# Patient Record
Sex: FEMALE | Race: WHITE | Employment: FULL TIME | URBAN - METROPOLITAN AREA
[De-identification: names, ages, dates, MRNs, and addresses within clinical notes are randomized per-mention and may not be internally consistent; named-entity substitution may affect disease eponyms.]

---

## 2023-04-05 ENCOUNTER — HOSPITAL ENCOUNTER (OUTPATIENT)
Age: 33
Discharge: HOME | End: 2023-04-05
Payer: COMMERCIAL

## 2023-04-05 LAB
ABSOLUTE EOS #: 0.07 K/UL (ref 0–0.44)
ABSOLUTE IMMATURE GRANULOCYTE: <0.03 K/UL (ref 0–0.3)
ABSOLUTE LYMPH #: 3.94 K/UL (ref 1.1–3.7)
ABSOLUTE MONO #: 0.49 K/UL (ref 0.1–1.2)
ALBUMIN SERPL-MCNC: 4.7 G/DL (ref 3.5–5.2)
ALBUMIN/GLOBULIN RATIO: 1.7 (ref 1–2.5)
ALP SERPL-CCNC: 63 U/L (ref 35–104)
ALT SERPL-CCNC: 14 U/L (ref 5–33)
ANION GAP SERPL CALCULATED.3IONS-SCNC: 15 MMOL/L (ref 9–17)
AST SERPL-CCNC: 16 U/L
BASOPHILS # BLD: 1 % (ref 0–2)
BASOPHILS ABSOLUTE: 0.06 K/UL (ref 0–0.2)
BILIRUB SERPL-MCNC: 0.7 MG/DL (ref 0.3–1.2)
BUN SERPL-MCNC: 15 MG/DL (ref 6–20)
CALCIUM SERPL-MCNC: 10.1 MG/DL (ref 8.6–10.4)
CHLORIDE SERPL-SCNC: 105 MMOL/L (ref 98–107)
CHOLEST SERPL-MCNC: 233 MG/DL
CHOLESTEROL/HDL RATIO: 2.9
CO2 SERPL-SCNC: 20 MMOL/L (ref 20–31)
CREAT SERPL-MCNC: 0.92 MG/DL (ref 0.5–0.9)
EOSINOPHILS RELATIVE PERCENT: 1 % (ref 1–4)
GFR SERPL CREATININE-BSD FRML MDRD: >60 ML/MIN/1.73M2
GLUCOSE SERPL-MCNC: 89 MG/DL (ref 70–99)
HCT VFR BLD AUTO: 41.7 % (ref 36.3–47.1)
HDLC SERPL-MCNC: 81 MG/DL
HGB BLD-MCNC: 13.8 G/DL (ref 11.9–15.1)
IMMATURE GRANULOCYTES: 0 %
LDLC SERPL CALC-MCNC: 132 MG/DL (ref 0–130)
LYMPHOCYTES # BLD: 46 % (ref 24–43)
MCH RBC QN AUTO: 28.7 PG (ref 25.2–33.5)
MCHC RBC AUTO-ENTMCNC: 33.1 G/DL (ref 28.4–34.8)
MCV RBC AUTO: 86.7 FL (ref 82.6–102.9)
MONOCYTES # BLD: 6 % (ref 3–12)
NRBC AUTOMATED: 0 PER 100 WBC
PDW BLD-RTO: 12.6 % (ref 11.8–14.4)
PLATELET # BLD AUTO: 252 K/UL (ref 138–453)
PMV BLD AUTO: 10.9 FL (ref 8.1–13.5)
POTASSIUM SERPL-SCNC: 4.5 MMOL/L (ref 3.7–5.3)
PROT SERPL-MCNC: 7.4 G/DL (ref 6.4–8.3)
RBC # BLD: 4.81 M/UL (ref 3.95–5.11)
SEG NEUTROPHILS: 46 % (ref 36–65)
SEGMENTED NEUTROPHILS ABSOLUTE COUNT: 3.85 K/UL (ref 1.5–8.1)
SODIUM SERPL-SCNC: 140 MMOL/L (ref 135–144)
T3FREE SERPL-MCNC: 2.8 PG/ML (ref 2.02–4.43)
T4 FREE SERPL-MCNC: 1.01 NG/DL (ref 0.93–1.7)
TRIGL SERPL-MCNC: 98 MG/DL
TSH SERPL-ACNC: 1.07 UIU/ML (ref 0.3–5)
WBC # BLD AUTO: 8.4 K/UL (ref 3.5–11.3)

## 2023-04-05 PROCEDURE — 36415 COLL VENOUS BLD VENIPUNCTURE: CPT

## 2023-04-05 PROCEDURE — 85025 COMPLETE CBC W/AUTO DIFF WBC: CPT

## 2023-04-05 PROCEDURE — 84481 FREE ASSAY (FT-3): CPT

## 2023-04-05 PROCEDURE — 80061 LIPID PANEL: CPT

## 2023-04-05 PROCEDURE — 80053 COMPREHEN METABOLIC PANEL: CPT

## 2023-04-05 PROCEDURE — 84439 ASSAY OF FREE THYROXINE: CPT

## 2023-04-05 PROCEDURE — 84443 ASSAY THYROID STIM HORMONE: CPT

## 2023-09-23 DIAGNOSIS — F50.81 BINGE EATING DISORDER: ICD-10-CM

## 2023-09-23 DIAGNOSIS — J40 BRONCHITIS: ICD-10-CM

## 2023-09-23 DIAGNOSIS — F43.10 PTSD (POST-TRAUMATIC STRESS DISORDER): ICD-10-CM

## 2023-09-23 DIAGNOSIS — J45.21 MILD INTERMITTENT ASTHMA WITH ACUTE EXACERBATION: ICD-10-CM

## 2023-09-23 PROBLEM — E66.9 OBESITY: Status: ACTIVE | Noted: 2023-09-23

## 2023-09-23 PROBLEM — F50.819 BINGE EATING DISORDER: Status: ACTIVE | Noted: 2023-09-23

## 2023-09-23 PROBLEM — R53.83 FATIGUE: Status: ACTIVE | Noted: 2023-09-23

## 2023-09-23 PROBLEM — J02.9 ACUTE PHARYNGITIS, UNSPECIFIED: Status: ACTIVE | Noted: 2023-09-23

## 2023-09-23 PROBLEM — N63.0 UNSPECIFIED LUMP IN UNSPECIFIED BREAST: Status: ACTIVE | Noted: 2023-09-23

## 2023-09-23 PROBLEM — J01.00 ACUTE MAXILLARY SINUSITIS: Status: ACTIVE | Noted: 2023-09-23

## 2023-09-23 PROBLEM — F90.9 ADHD (ATTENTION DEFICIT HYPERACTIVITY DISORDER): Status: ACTIVE | Noted: 2023-09-23

## 2023-09-23 PROBLEM — F32.A DEPRESSION: Status: ACTIVE | Noted: 2023-09-23

## 2023-09-23 RX ORDER — LISDEXAMFETAMINE DIMESYLATE 20 MG/1
CAPSULE ORAL
COMMUNITY
Start: 2023-07-03

## 2023-09-23 RX ORDER — VALACYCLOVIR HYDROCHLORIDE 500 MG/1
500 TABLET, FILM COATED ORAL
COMMUNITY
Start: 2016-02-01

## 2023-09-25 ENCOUNTER — OFFICE VISIT (OUTPATIENT)
Age: 33
End: 2023-09-25
Payer: COMMERCIAL

## 2023-09-25 VITALS
WEIGHT: 179.13 LBS | BODY MASS INDEX: 31.74 KG/M2 | TEMPERATURE: 97.7 F | SYSTOLIC BLOOD PRESSURE: 130 MMHG | HEIGHT: 63 IN | RESPIRATION RATE: 14 BRPM | DIASTOLIC BLOOD PRESSURE: 70 MMHG | HEART RATE: 78 BPM

## 2023-09-25 DIAGNOSIS — E66.09 CLASS 1 OBESITY DUE TO EXCESS CALORIES WITH BODY MASS INDEX (BMI) OF 31.0 TO 31.9 IN ADULT, UNSPECIFIED WHETHER SERIOUS COMORBIDITY PRESENT: Primary | ICD-10-CM

## 2023-09-25 PROCEDURE — 99213 OFFICE O/P EST LOW 20 MIN: CPT | Performed by: FAMILY MEDICINE

## 2023-09-25 PROCEDURE — G8427 DOCREV CUR MEDS BY ELIG CLIN: HCPCS | Performed by: FAMILY MEDICINE

## 2023-09-25 PROCEDURE — G8417 CALC BMI ABV UP PARAM F/U: HCPCS | Performed by: FAMILY MEDICINE

## 2023-09-25 PROCEDURE — 1036F TOBACCO NON-USER: CPT | Performed by: FAMILY MEDICINE

## 2023-09-25 RX ORDER — SEMAGLUTIDE 1.34 MG/ML
0.25 INJECTION, SOLUTION SUBCUTANEOUS WEEKLY
Qty: 1.5 ML | Refills: 1 | Status: SHIPPED | OUTPATIENT
Start: 2023-09-25

## 2023-09-25 SDOH — ECONOMIC STABILITY: INCOME INSECURITY: HOW HARD IS IT FOR YOU TO PAY FOR THE VERY BASICS LIKE FOOD, HOUSING, MEDICAL CARE, AND HEATING?: NOT HARD AT ALL

## 2023-09-25 SDOH — ECONOMIC STABILITY: FOOD INSECURITY: WITHIN THE PAST 12 MONTHS, YOU WORRIED THAT YOUR FOOD WOULD RUN OUT BEFORE YOU GOT MONEY TO BUY MORE.: NEVER TRUE

## 2023-09-25 SDOH — ECONOMIC STABILITY: FOOD INSECURITY: WITHIN THE PAST 12 MONTHS, THE FOOD YOU BOUGHT JUST DIDN'T LAST AND YOU DIDN'T HAVE MONEY TO GET MORE.: NEVER TRUE

## 2023-09-25 SDOH — ECONOMIC STABILITY: HOUSING INSECURITY
IN THE LAST 12 MONTHS, WAS THERE A TIME WHEN YOU DID NOT HAVE A STEADY PLACE TO SLEEP OR SLEPT IN A SHELTER (INCLUDING NOW)?: NO

## 2023-09-25 ASSESSMENT — PATIENT HEALTH QUESTIONNAIRE - PHQ9
5. POOR APPETITE OR OVEREATING: 0
SUM OF ALL RESPONSES TO PHQ QUESTIONS 1-9: 0
SUM OF ALL RESPONSES TO PHQ QUESTIONS 1-9: 0
3. TROUBLE FALLING OR STAYING ASLEEP: 0
SUM OF ALL RESPONSES TO PHQ9 QUESTIONS 1 & 2: 0
7. TROUBLE CONCENTRATING ON THINGS, SUCH AS READING THE NEWSPAPER OR WATCHING TELEVISION: 0
SUM OF ALL RESPONSES TO PHQ QUESTIONS 1-9: 0
9. THOUGHTS THAT YOU WOULD BE BETTER OFF DEAD, OR OF HURTING YOURSELF: 0
10. IF YOU CHECKED OFF ANY PROBLEMS, HOW DIFFICULT HAVE THESE PROBLEMS MADE IT FOR YOU TO DO YOUR WORK, TAKE CARE OF THINGS AT HOME, OR GET ALONG WITH OTHER PEOPLE: 0
4. FEELING TIRED OR HAVING LITTLE ENERGY: 0
6. FEELING BAD ABOUT YOURSELF - OR THAT YOU ARE A FAILURE OR HAVE LET YOURSELF OR YOUR FAMILY DOWN: 0
8. MOVING OR SPEAKING SO SLOWLY THAT OTHER PEOPLE COULD HAVE NOTICED. OR THE OPPOSITE, BEING SO FIGETY OR RESTLESS THAT YOU HAVE BEEN MOVING AROUND A LOT MORE THAN USUAL: 0
2. FEELING DOWN, DEPRESSED OR HOPELESS: 0
SUM OF ALL RESPONSES TO PHQ QUESTIONS 1-9: 0
1. LITTLE INTEREST OR PLEASURE IN DOING THINGS: 0

## 2023-09-25 ASSESSMENT — ENCOUNTER SYMPTOMS
SHORTNESS OF BREATH: 0
CHEST TIGHTNESS: 0

## 2023-10-03 ENCOUNTER — TELEPHONE (OUTPATIENT)
Age: 33
End: 2023-10-03

## 2023-10-03 ENCOUNTER — PATIENT MESSAGE (OUTPATIENT)
Age: 33
End: 2023-10-03

## 2023-10-10 ENCOUNTER — TELEPHONE (OUTPATIENT)
Age: 33
End: 2023-10-10

## 2023-10-10 RX ORDER — SEMAGLUTIDE 0.25 MG/.5ML
0.25 INJECTION, SOLUTION SUBCUTANEOUS
Qty: 2 ML | Refills: 1 | Status: SHIPPED | OUTPATIENT
Start: 2023-10-10

## 2023-10-10 NOTE — TELEPHONE ENCOUNTER
Dr. Leonardo Rausch, patient would like to try [facility]JIMMY BERRY. If you call in script they will send me a prior auth.  Thanks

## 2023-10-10 NOTE — TELEPHONE ENCOUNTER
Bedside and Verbal shift change report given to [facility] (oncoming nurse) by ELENA Aaron RN (offgoing nurse). Report included the following information SBAR, Kardex, Intake/Output and MAR. [facility]

## 2023-10-10 NOTE — TELEPHONE ENCOUNTER
Script for wegovy has been sent to [employer]. OK to await prior auth and document failures as per my last note and below.   THX, NSB

## 2023-11-17 ENCOUNTER — HOSPITAL ENCOUNTER (OUTPATIENT)
Age: 33
Setting detail: SPECIMEN
Discharge: HOME | End: 2023-11-17

## 2023-11-17 ENCOUNTER — TELEPHONE (OUTPATIENT)
Age: 33
End: 2023-11-17

## 2023-11-17 DIAGNOSIS — R73.01 ELEVATED FASTING GLUCOSE: ICD-10-CM

## 2023-11-17 LAB
EST. AVERAGE GLUCOSE BLD GHB EST-MCNC: 91 MG/DL
HBA1C MFR BLD: 4.8 % (ref 4–6)

## 2023-11-17 NOTE — TELEPHONE ENCOUNTER
Patient dropped off paperwork for nursing program for Dr Walker Matters to complete. Please advise when ready for . Papers placed on Dr Brian Weldon desk.

## 2024-01-26 ENCOUNTER — OFFICE VISIT (OUTPATIENT)
Age: 34
End: 2024-01-26

## 2024-01-26 VITALS
WEIGHT: 188 LBS | HEIGHT: 63 IN | SYSTOLIC BLOOD PRESSURE: 112 MMHG | BODY MASS INDEX: 33.31 KG/M2 | OXYGEN SATURATION: 96 % | DIASTOLIC BLOOD PRESSURE: 76 MMHG | TEMPERATURE: 98.4 F | HEART RATE: 90 BPM

## 2024-01-26 DIAGNOSIS — E66.09 CLASS 1 OBESITY DUE TO EXCESS CALORIES WITH BODY MASS INDEX (BMI) OF 33.0 TO 33.9 IN ADULT, UNSPECIFIED WHETHER SERIOUS COMORBIDITY PRESENT: Primary | ICD-10-CM

## 2024-01-26 RX ORDER — TOPIRAMATE 25 MG/1
25 TABLET ORAL 2 TIMES DAILY
Qty: 180 TABLET | Refills: 1 | Status: SHIPPED | OUTPATIENT
Start: 2024-01-26

## 2024-01-26 RX ORDER — METFORMIN HYDROCHLORIDE 500 MG/1
1000 TABLET, EXTENDED RELEASE ORAL 2 TIMES DAILY
Qty: 60 TABLET | Refills: 2 | Status: SHIPPED | OUTPATIENT
Start: 2024-01-26

## 2024-01-26 ASSESSMENT — PATIENT HEALTH QUESTIONNAIRE - PHQ9
10. IF YOU CHECKED OFF ANY PROBLEMS, HOW DIFFICULT HAVE THESE PROBLEMS MADE IT FOR YOU TO DO YOUR WORK, TAKE CARE OF THINGS AT HOME, OR GET ALONG WITH OTHER PEOPLE: 0
7. TROUBLE CONCENTRATING ON THINGS, SUCH AS READING THE NEWSPAPER OR WATCHING TELEVISION: 0
9. THOUGHTS THAT YOU WOULD BE BETTER OFF DEAD, OR OF HURTING YOURSELF: 0
SUM OF ALL RESPONSES TO PHQ QUESTIONS 1-9: 0
SUM OF ALL RESPONSES TO PHQ QUESTIONS 1-9: 0
1. LITTLE INTEREST OR PLEASURE IN DOING THINGS: 0
8. MOVING OR SPEAKING SO SLOWLY THAT OTHER PEOPLE COULD HAVE NOTICED. OR THE OPPOSITE, BEING SO FIGETY OR RESTLESS THAT YOU HAVE BEEN MOVING AROUND A LOT MORE THAN USUAL: 0
2. FEELING DOWN, DEPRESSED OR HOPELESS: 0
SUM OF ALL RESPONSES TO PHQ9 QUESTIONS 1 & 2: 0
4. FEELING TIRED OR HAVING LITTLE ENERGY: 0
3. TROUBLE FALLING OR STAYING ASLEEP: 0
5. POOR APPETITE OR OVEREATING: 0
SUM OF ALL RESPONSES TO PHQ QUESTIONS 1-9: 0
6. FEELING BAD ABOUT YOURSELF - OR THAT YOU ARE A FAILURE OR HAVE LET YOURSELF OR YOUR FAMILY DOWN: 0
SUM OF ALL RESPONSES TO PHQ QUESTIONS 1-9: 0

## 2024-01-26 NOTE — PROGRESS NOTES
1    metFORMIN (GLUCOPHAGE-XR) 500 MG extended release tablet     Sig: Take 2 tablets by mouth in the morning and at bedtime     Dispense:  60 tablet     Refill:  2             No follow-ups on file.      COMMUNICATION:     Disposition and Communication:     Electronically signed by Ney Murphy MD on 1/26/2024 at 10:16 AM

## 2024-11-01 ENCOUNTER — TELEPHONE (OUTPATIENT)
Age: 34
End: 2024-11-01

## 2024-11-01 DIAGNOSIS — R73.01 ELEVATED FASTING GLUCOSE: Primary | ICD-10-CM

## 2024-11-01 DIAGNOSIS — E66.811 CLASS 1 OBESITY DUE TO EXCESS CALORIES WITH BODY MASS INDEX (BMI) OF 31.0 TO 31.9 IN ADULT, UNSPECIFIED WHETHER SERIOUS COMORBIDITY PRESENT: ICD-10-CM

## 2024-11-01 DIAGNOSIS — Z00.00 HEALTH MAINTENANCE EXAMINATION: ICD-10-CM

## 2024-11-01 DIAGNOSIS — E66.09 CLASS 1 OBESITY DUE TO EXCESS CALORIES WITH BODY MASS INDEX (BMI) OF 31.0 TO 31.9 IN ADULT, UNSPECIFIED WHETHER SERIOUS COMORBIDITY PRESENT: ICD-10-CM

## 2024-11-01 NOTE — TELEPHONE ENCOUNTER
Patient has an appt on 11/11. Patient wanted some lab ordered. She has been on ozepmic since April and she is down quite a bit.  [facility]

## 2024-11-11 ENCOUNTER — OFFICE VISIT (OUTPATIENT)
Age: 34
End: 2024-11-11
Payer: COMMERCIAL

## 2024-11-11 VITALS
BODY MASS INDEX: 26.35 KG/M2 | DIASTOLIC BLOOD PRESSURE: 72 MMHG | OXYGEN SATURATION: 99 % | WEIGHT: 148.7 LBS | SYSTOLIC BLOOD PRESSURE: 114 MMHG | HEART RATE: 80 BPM | HEIGHT: 63 IN

## 2024-11-11 DIAGNOSIS — E66.09 CLASS 1 OBESITY DUE TO EXCESS CALORIES WITH BODY MASS INDEX (BMI) OF 31.0 TO 31.9 IN ADULT, UNSPECIFIED WHETHER SERIOUS COMORBIDITY PRESENT: Primary | ICD-10-CM

## 2024-11-11 DIAGNOSIS — R73.01 ELEVATED FASTING GLUCOSE: ICD-10-CM

## 2024-11-11 DIAGNOSIS — E28.2 PCOS (POLYCYSTIC OVARIAN SYNDROME): ICD-10-CM

## 2024-11-11 DIAGNOSIS — E66.811 CLASS 1 OBESITY DUE TO EXCESS CALORIES WITH BODY MASS INDEX (BMI) OF 31.0 TO 31.9 IN ADULT, UNSPECIFIED WHETHER SERIOUS COMORBIDITY PRESENT: Primary | ICD-10-CM

## 2024-11-11 DIAGNOSIS — F90.2 ATTENTION DEFICIT HYPERACTIVITY DISORDER (ADHD), COMBINED TYPE: ICD-10-CM

## 2024-11-11 PROCEDURE — 1036F TOBACCO NON-USER: CPT | Performed by: FAMILY MEDICINE

## 2024-11-11 PROCEDURE — G8484 FLU IMMUNIZE NO ADMIN: HCPCS | Performed by: FAMILY MEDICINE

## 2024-11-11 PROCEDURE — G8417 CALC BMI ABV UP PARAM F/U: HCPCS | Performed by: FAMILY MEDICINE

## 2024-11-11 PROCEDURE — G8427 DOCREV CUR MEDS BY ELIG CLIN: HCPCS | Performed by: FAMILY MEDICINE

## 2024-11-11 PROCEDURE — 99214 OFFICE O/P EST MOD 30 MIN: CPT | Performed by: FAMILY MEDICINE

## 2024-11-11 SDOH — ECONOMIC STABILITY: FOOD INSECURITY: WITHIN THE PAST 12 MONTHS, YOU WORRIED THAT YOUR FOOD WOULD RUN OUT BEFORE YOU GOT MONEY TO BUY MORE.: NEVER TRUE

## 2024-11-11 SDOH — ECONOMIC STABILITY: FOOD INSECURITY: WITHIN THE PAST 12 MONTHS, THE FOOD YOU BOUGHT JUST DIDN'T LAST AND YOU DIDN'T HAVE MONEY TO GET MORE.: NEVER TRUE

## 2024-11-11 SDOH — ECONOMIC STABILITY: INCOME INSECURITY: HOW HARD IS IT FOR YOU TO PAY FOR THE VERY BASICS LIKE FOOD, HOUSING, MEDICAL CARE, AND HEATING?: NOT HARD AT ALL

## 2024-11-11 ASSESSMENT — ENCOUNTER SYMPTOMS
CHEST TIGHTNESS: 0
SHORTNESS OF BREATH: 0

## 2024-11-11 NOTE — PROGRESS NOTES
Unspecified lump in unspecified breast    Fatigue       Past Medical History:   Diagnosis Date    Syncope        Past Surgical History:   Procedure Laterality Date    ADENOIDECTOMY      TONSILLECTOMY          Social History     Socioeconomic History    Marital status:      Spouse name: None    Number of children: 1    Years of education: None    Highest education level: None   Tobacco Use    Smoking status: Former     Current packs/day: 0.00     Types: Cigarettes     Quit date: 3/29/2013     Years since quittin.6    Smokeless tobacco: Never   Substance and Sexual Activity    Alcohol use: Yes     Comment: social    Drug use: No    Sexual activity: Yes     Partners: Male     Social Determinants of Health     Financial Resource Strain: Low Risk  (2024)    Overall Financial Resource Strain (CARDIA)     Difficulty of Paying Living Expenses: Not hard at all   Food Insecurity: No Food Insecurity (2024)    Hunger Vital Sign     Worried About Running Out of Food in the Last Year: Never true     Ran Out of Food in the Last Year: Never true   Transportation Needs: Unknown (2024)    PRAPARE - Transportation     Lack of Transportation (Non-Medical): No   Housing Stability: Unknown (2024)    Housing Stability Vital Sign     Homeless in the Last Year: No        No family history on file.     PHYSICAL EXAM     /72   Pulse 80   Ht 1.6 m (5' 3\")   Wt 67.4 kg (148 lb 11.2 oz)   SpO2 99%   BMI 26.34 kg/m²    Physical Exam  Constitutional:       Appearance: Normal appearance.   HENT:      Head: Normocephalic and atraumatic.      Right Ear: Tympanic membrane normal.   Eyes:      Extraocular Movements: Extraocular movements intact.      Pupils: Pupils are equal, round, and reactive to light.   Cardiovascular:      Rate and Rhythm: Normal rate and regular rhythm.      Pulses: Normal pulses.   Pulmonary:      Breath sounds: Normal breath sounds.   Abdominal:      General: Bowel sounds are

## 2024-11-11 NOTE — PATIENT INSTRUCTIONS
Mai    Thank you for choosing [facility].  We know you have options when it comes to your healthcare; we appreciate that you chose us. Our goal is to provide exceptional  service and world class care to every patient.  You will be receiving a survey via email or text message asking for your feedback.  Please take a few minutes to share your thoughts about your recent visit. Your comments help us understand what we do well and ways we can improve.  Thank you in advance for your valuable feedback.      Dr. Jeffrey ECHEVERRIA MA

## 2024-12-04 ENCOUNTER — TELEPHONE (OUTPATIENT)
Age: 34
End: 2024-12-04

## 2024-12-04 DIAGNOSIS — F90.2 ATTENTION DEFICIT HYPERACTIVITY DISORDER (ADHD), COMBINED TYPE: ICD-10-CM

## 2024-12-04 NOTE — TELEPHONE ENCOUNTER
I ran a PA for patients [place] and it is completely covered if she gets it through [facility], spoke with patient and is requesting that we send the script to [facility]

## 2024-12-06 ENCOUNTER — PATIENT MESSAGE (OUTPATIENT)
Age: 34
End: 2024-12-06

## 2024-12-06 DIAGNOSIS — R30.0 DYSURIA: Primary | ICD-10-CM

## 2024-12-06 RX ORDER — CEPHALEXIN 500 MG/1
500 CAPSULE ORAL 2 TIMES DAILY
Qty: 14 CAPSULE | Refills: 0 | Status: SHIPPED | OUTPATIENT
Start: 2024-12-06 | End: 2024-12-13

## 2024-12-06 NOTE — TELEPHONE ENCOUNTER
Please fax order for UA C&S to [facility] outpatient lab per patient request

## 2024-12-07 LAB
BILIRUBIN URINE: NORMAL
BLOOD, URINE: NEGATIVE
CLARITY, UA: CLEAR
COLOR, UA: NORMAL
GLUCOSE URINE: NORMAL
KETONES, URINE: NEGATIVE
LEUKOCYTE ESTERASE, URINE: NEGATIVE
NITRITE, URINE: NEGATIVE
PH UA: 6.5 (ref 4.5–8)
PROTEIN UA: NEGATIVE
SPECIFIC GRAVITY UA: 1.02 (ref 1–1.03)
UROBILINOGEN, URINE: NORMAL

## 2024-12-09 DIAGNOSIS — R30.0 DYSURIA: ICD-10-CM

## 2025-05-08 ENCOUNTER — PATIENT MESSAGE (OUTPATIENT)
Age: 35
End: 2025-05-08

## 2025-05-08 NOTE — TELEPHONE ENCOUNTER
Patient needed to R/S appt. We scheduled it for 11/13/25 @ [facility]. I faxed her labs to [facility] for her

## 2025-06-05 ENCOUNTER — HOSPITAL ENCOUNTER (OUTPATIENT)
Age: 35
Discharge: HOME | End: 2025-06-05
Payer: COMMERCIAL

## 2025-06-05 DIAGNOSIS — R73.01 ELEVATED FASTING GLUCOSE: ICD-10-CM

## 2025-06-05 DIAGNOSIS — E66.09 CLASS 1 OBESITY DUE TO EXCESS CALORIES WITH BODY MASS INDEX (BMI) OF 31.0 TO 31.9 IN ADULT, UNSPECIFIED WHETHER SERIOUS COMORBIDITY PRESENT: ICD-10-CM

## 2025-06-05 DIAGNOSIS — E66.811 CLASS 1 OBESITY DUE TO EXCESS CALORIES WITH BODY MASS INDEX (BMI) OF 31.0 TO 31.9 IN ADULT, UNSPECIFIED WHETHER SERIOUS COMORBIDITY PRESENT: ICD-10-CM

## 2025-06-05 DIAGNOSIS — Z00.00 HEALTH MAINTENANCE EXAMINATION: ICD-10-CM

## 2025-06-05 LAB
ALBUMIN SERPL-MCNC: 4.6 G/DL (ref 3.5–5.2)
ALBUMIN/GLOB SERPL: 2.1 {RATIO} (ref 1–2.5)
ALP SERPL-CCNC: 50 U/L (ref 35–104)
ALT SERPL-CCNC: 16 U/L (ref 10–35)
ANION GAP SERPL CALCULATED.3IONS-SCNC: 10 MMOL/L (ref 9–16)
AST SERPL-CCNC: 19 U/L (ref 10–35)
BASOPHILS # BLD: 0.05 K/UL (ref 0–0.2)
BASOPHILS NFR BLD: 1 % (ref 0–2)
BILIRUB SERPL-MCNC: 1.4 MG/DL (ref 0–1.2)
BUN SERPL-MCNC: 11 MG/DL (ref 6–20)
CALCIUM SERPL-MCNC: 9.9 MG/DL (ref 8.6–10.4)
CHLORIDE SERPL-SCNC: 105 MMOL/L (ref 98–107)
CHOLEST SERPL-MCNC: 202 MG/DL (ref 0–199)
CHOLESTEROL/HDL RATIO: 2.4
CO2 SERPL-SCNC: 26 MMOL/L (ref 20–31)
CREAT SERPL-MCNC: 1 MG/DL (ref 0.6–0.9)
EOSINOPHIL # BLD: 0.05 K/UL (ref 0–0.44)
EOSINOPHILS RELATIVE PERCENT: 1 % (ref 1–4)
ERYTHROCYTE [DISTWIDTH] IN BLOOD BY AUTOMATED COUNT: 12.5 % (ref 11.8–14.4)
EST. AVERAGE GLUCOSE BLD GHB EST-MCNC: 91 MG/DL
GFR, ESTIMATED: 75 ML/MIN/1.73M2
GLUCOSE SERPL-MCNC: 80 MG/DL (ref 74–99)
HBA1C MFR BLD: 4.8 % (ref 4–6)
HCT VFR BLD AUTO: 40.5 % (ref 36.3–47.1)
HDLC SERPL-MCNC: 85 MG/DL
HGB BLD-MCNC: 13.7 G/DL (ref 11.9–15.1)
IMM GRANULOCYTES # BLD AUTO: <0.03 K/UL (ref 0–0.3)
IMM GRANULOCYTES NFR BLD: 0 %
LDLC SERPL CALC-MCNC: 104 MG/DL (ref 0–100)
LYMPHOCYTES NFR BLD: 3.28 K/UL (ref 1.1–3.7)
LYMPHOCYTES RELATIVE PERCENT: 49 % (ref 24–43)
MCH RBC QN AUTO: 29.7 PG (ref 25.2–33.5)
MCHC RBC AUTO-ENTMCNC: 33.8 G/DL (ref 28.4–34.8)
MCV RBC AUTO: 87.9 FL (ref 82.6–102.9)
MONOCYTES NFR BLD: 0.43 K/UL (ref 0.1–1.2)
MONOCYTES NFR BLD: 7 % (ref 3–12)
NEUTROPHILS NFR BLD: 42 % (ref 36–65)
NEUTS SEG NFR BLD: 2.75 K/UL (ref 1.5–8.1)
NRBC BLD-RTO: 0 PER 100 WBC
PLATELET # BLD AUTO: 279 K/UL (ref 138–453)
PMV BLD AUTO: 10.3 FL (ref 8.1–13.5)
POTASSIUM SERPL-SCNC: 4.3 MMOL/L (ref 3.7–5.3)
PROT SERPL-MCNC: 6.8 G/DL (ref 6.6–8.7)
RBC # BLD AUTO: 4.61 M/UL (ref 3.95–5.11)
SODIUM SERPL-SCNC: 141 MMOL/L (ref 136–145)
TRIGL SERPL-MCNC: 64 MG/DL
VLDLC SERPL CALC-MCNC: 13 MG/DL (ref 1–30)
WBC OTHER # BLD: 6.6 K/UL (ref 3.5–11.3)

## 2025-06-05 PROCEDURE — 80053 COMPREHEN METABOLIC PANEL: CPT

## 2025-06-05 PROCEDURE — 80061 LIPID PANEL: CPT

## 2025-06-05 PROCEDURE — 85025 COMPLETE CBC W/AUTO DIFF WBC: CPT

## 2025-06-05 PROCEDURE — 83036 HEMOGLOBIN GLYCOSYLATED A1C: CPT

## 2025-06-05 PROCEDURE — 36415 COLL VENOUS BLD VENIPUNCTURE: CPT
